# Patient Record
(demographics unavailable — no encounter records)

---

## 2025-03-14 NOTE — ASSESSMENT
[FreeTextEntry1] : #Sore throat - Presenting with 5-6 days of right mouth/throat discomfort - No fever/chills or URI symptoms presents - Not improving despite salt water rinse, mouth wash from dentist  - No exudates to suggest a bacterial infection at this time  - Etiology possible tonsillolith, less likely parotid swelling given absence of pain and normal nodes - Advised increase PO fluids, sour sucking candies - Call back if any fevers, change in symptoms - Hold antibiotics at this time

## 2025-03-14 NOTE — REVIEW OF SYSTEMS
[Sore Throat] : sore throat [Fever] : no fever [Chills] : no chills [Earache] : no earache [Nasal Discharge] : no nasal discharge [Headache] : no headache [Dizziness] : no dizziness

## 2025-03-14 NOTE — PHYSICAL EXAM
[No Acute Distress] : no acute distress [No Lymphadenopathy] : no lymphadenopathy [Normal Affect] : the affect was normal [Normal Insight/Judgement] : insight and judgment were intact [de-identified] : slight erythema of the right side of throat with no definitive sores

## 2025-03-14 NOTE — HISTORY OF PRESENT ILLNESS
[FreeTextEntry8] : 33yo male presenting to the office complaining of soreness in mouth for the pat 5-6 days. Admits to feeling some discomfort in the back right side of his mouth - though it was a canker sore or mouth sore. Using salt water rinses, rinse from dentist without much improvement.  No fever/chills, cough, rhinorrhea or any cold symptoms.  Reports no better/worse with swallowing or even doing nothing.

## 2025-04-10 NOTE — DISCUSSION/SUMMARY
[EKG obtained to assist in diagnosis and management of assessed problem(s)] : EKG obtained to assist in diagnosis and management of assessed problem(s) [FreeTextEntry1] : Pt is a 33 y/o M PMH elevated LDL  TTE 01/2023 EF 55-60%, trace MR/TR ETT 01/2023 17.5 METS, no ischemic changes VSS  bradycardia: asymptomatic will monitor  Elevated LDL: We discussed the importance of aggressive risk factor modification, including continuing medications as directed, following a healthy diet of unprocessed, low saturated fat and carbohydrate diet as close to a plant based or Mediterranean as possible, regular exercise at least 30 minutes of cardiovascular exercise daily.  Also advised to report any symptoms immediately.  labs to be repeated at PCP annual visit  Most recent available lab results were reviewed with pt. Pt will return in 12-24 mnths or sooner as needed but I encouraged communication via phone or portal if necessary.  We will call pt with test results when applicable and arrange for expedited follow up if warranted.  The described plan was discussed with the pt.  All questions and concerns were addressed to the best of my knowledge.

## 2025-04-10 NOTE — HISTORY OF PRESENT ILLNESS
[FreeTextEntry1] : Pt is a 33 y/o M who presents today for f/u.  He has PMH elevated LDL He is feeling well overall - denies CP, SOB, diaphoresis, palpitations, dizziness, syncope, LE edema, PND, orthopnea.   TTE 01/2023 EF 55-60%, trace MR/TR ETT 01/2023 17.5 METS, no ischemic changes  Family hx: mother HLD/preDM, father HLD, grandmother MI 70 Smoking status: quit 2012 social ETOH occasional marijuna use Current exercise: running 5x/week Daily water intake:  oz Daily caffeine intake: none OTC medications: MVI, iron, Mg Previous hospitalizations: none